# Patient Record
Sex: MALE | Race: WHITE | NOT HISPANIC OR LATINO | Employment: FULL TIME | URBAN - NONMETROPOLITAN AREA
[De-identification: names, ages, dates, MRNs, and addresses within clinical notes are randomized per-mention and may not be internally consistent; named-entity substitution may affect disease eponyms.]

---

## 2023-11-10 ENCOUNTER — TELEPHONE (OUTPATIENT)
Dept: UROLOGY | Facility: CLINIC | Age: 38
End: 2023-11-10

## 2023-11-10 NOTE — TELEPHONE ENCOUNTER
Pt coming in for vasectomy reversal consult Call placed to pt to find out if he has previously seen urologist. Pt was seen at [facility] urology in [facility] dr. Shayna Lea. Pt PCP office is Dr. Franki Clayton phone number [phone]. PCP will not fax records without release. Pt will get records for upcoming appt. Dr. Shayna Lea faxing over last office notes.

## 2023-11-15 ENCOUNTER — OFFICE VISIT (OUTPATIENT)
Dept: UROLOGY | Facility: CLINIC | Age: 38
End: 2023-11-15
Payer: COMMERCIAL

## 2023-11-15 VITALS
BODY MASS INDEX: 26.07 KG/M2 | TEMPERATURE: 97.6 F | HEIGHT: 69 IN | HEART RATE: 72 BPM | WEIGHT: 176 LBS | DIASTOLIC BLOOD PRESSURE: 72 MMHG | OXYGEN SATURATION: 99 % | SYSTOLIC BLOOD PRESSURE: 136 MMHG

## 2023-11-15 DIAGNOSIS — N46.01 INFERTILITY DUE TO AZOOSPERMIA: Primary | ICD-10-CM

## 2023-11-15 PROCEDURE — 99203 OFFICE O/P NEW LOW 30 MIN: CPT | Performed by: UROLOGY

## 2023-11-15 RX ORDER — TRIAMCINOLONE ACETONIDE 1 MG/G
CREAM TOPICAL
COMMUNITY
Start: 2023-10-18

## 2023-11-15 NOTE — PROGRESS NOTES
UROLOGY PROGRESS NOTE         NAME: Romulo Bartlett  AGE: 34 y.o. SEX: male  : 1985   MRN: [id number]    DATE: 11/15/2023  TIME: 4:45 PM    Assessment and Plan      Impression:   1. 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 Has a new partner he is interested in a vasectomy reversal.  He has never fathered a child.  Plan: Pros cons options outcomes discussed. He is interested in proceeding if possible. Patient's anatomy is amenable to vasectomy reversal bilaterally. Success rate roughly 70%. Chief Complaint     Chief Complaint   Patient presents with    Advice Only      Vasectomy reversal      History of Present Illness     HPI: Romulo Bartlett is a 34y.o. year old male who presents with consideration of a vasectomy reversal.  He had vasectomy 2 years ago after he and his ex-wife decided that conceiving was no longer an option. His wife did have medical issues with PCOS. He proceeded with a vasectomy at that time. It was uncomplicated. He has never fathered a child. His partner is 43years old. She has 3 kids the youngest 13. Oldest 20. No trouble conceiving. We discussed the vasectomy reversal procedure in detail. Cost is an issue. He is interested in proceeding if the cost is reasonable. Otherwise we will need to find him a facility hand surgeon that there is cost reasonable. The following portions of the patient's history were reviewed and updated as appropriate: allergies, current medications, past family history, past medical history, past social history, past surgical history and problem list.  History reviewed. No pertinent past medical history. Past Surgical History:   Procedure Laterality Date    VASECTOMY  2021     shoulder  Review of Systems     Const: Denies chills, fever and weight loss. CV: Denies chest pain. Resp: Denies SOB. GI: Denies abdominal pain, nausea and vomiting.   : Denies symptoms other than stated above.  Musculo: Denies back pain. Objective   /72 (BP Location: Left arm, Patient Position: Sitting, Cuff Size: Standard)   Pulse 72   Temp 97.6 °F (36.4 °C)   Ht 5' 9" (1.753 m)   Wt 79.8 kg (176 lb)   SpO2 99%   BMI 25.99 kg/m²     Physical Exam  Const: Appears healthy and well developed. No signs of acute distress present. Resp: Respirations are regular and unlabored. CV: Rate is regular. Rhythm is regular. Abdomen: Abdomen is soft, nontender, and nondistended. Kidneys are not palpable. : Normal testicles no hernias. Both vasa easy to palpate. No significant separation. Sperm granulomas noted bilaterally. At the vas site epididymis normal bilateral.  Psych: Patient's attitude is cooperative.  Mood is normal. Affect is normal.    Current Medications     Current Outpatient Medications:     triamcinolone (KENALOG) 0.1 % cream, , Disp: , Rfl:         Elida Soriano MD

## 2023-12-04 ENCOUNTER — TELEPHONE (OUTPATIENT)
Age: 38
End: 2023-12-04

## 2023-12-04 NOTE — TELEPHONE ENCOUNTER
Patient last seen with Dr. Tai Hudson on 11/15/23. Patient calling to see if Dr. Tai Hudson found any other places that would do the Vasectomy reversal in an outpatient setting.     Patient requesting a call back at [phone]